# Patient Record
Sex: FEMALE | Race: WHITE | NOT HISPANIC OR LATINO | Employment: FULL TIME | URBAN - METROPOLITAN AREA
[De-identification: names, ages, dates, MRNs, and addresses within clinical notes are randomized per-mention and may not be internally consistent; named-entity substitution may affect disease eponyms.]

---

## 2019-02-01 ENCOUNTER — HOSPITAL ENCOUNTER (OUTPATIENT)
Dept: LAB | Facility: MEDICAL CENTER | Age: 39
End: 2019-02-01
Attending: OBSTETRICS & GYNECOLOGY
Payer: COMMERCIAL

## 2019-02-01 PROCEDURE — 88175 CYTOPATH C/V AUTO FLUID REDO: CPT

## 2019-02-01 PROCEDURE — 87624 HPV HI-RISK TYP POOLED RSLT: CPT

## 2019-02-05 LAB
CYTOLOGY REG CYTOL: NORMAL
HPV HR 12 DNA CVX QL NAA+PROBE: NEGATIVE
HPV16 DNA SPEC QL NAA+PROBE: NEGATIVE
HPV18 DNA SPEC QL NAA+PROBE: NEGATIVE
SPECIMEN SOURCE: NORMAL

## 2020-02-14 ENCOUNTER — OFFICE VISIT (OUTPATIENT)
Dept: URGENT CARE | Facility: PHYSICIAN GROUP | Age: 40
End: 2020-02-14
Payer: COMMERCIAL

## 2020-02-14 VITALS
WEIGHT: 245 LBS | HEART RATE: 98 BPM | BODY MASS INDEX: 37.13 KG/M2 | OXYGEN SATURATION: 100 % | TEMPERATURE: 98.6 F | HEIGHT: 68 IN | SYSTOLIC BLOOD PRESSURE: 102 MMHG | DIASTOLIC BLOOD PRESSURE: 74 MMHG | RESPIRATION RATE: 16 BRPM

## 2020-02-14 DIAGNOSIS — J45.31 MILD PERSISTENT ASTHMA WITH EXACERBATION: ICD-10-CM

## 2020-02-14 DIAGNOSIS — R05.9 COUGH: ICD-10-CM

## 2020-02-14 PROCEDURE — 99203 OFFICE O/P NEW LOW 30 MIN: CPT | Performed by: FAMILY MEDICINE

## 2020-02-14 RX ORDER — AZITHROMYCIN 250 MG/1
TABLET, FILM COATED ORAL
Qty: 6 TAB | Refills: 0 | Status: SHIPPED | OUTPATIENT
Start: 2020-02-14 | End: 2024-03-04

## 2020-02-14 RX ORDER — ALBUTEROL SULFATE 90 UG/1
2 POWDER, METERED RESPIRATORY (INHALATION) EVERY 4 HOURS PRN
Qty: 1 EACH | Refills: 0 | Status: SHIPPED | OUTPATIENT
Start: 2020-02-14 | End: 2022-12-13 | Stop reason: SDUPTHER

## 2020-02-14 RX ORDER — PREDNISONE 20 MG/1
40 TABLET ORAL DAILY
Qty: 10 TAB | Refills: 0 | Status: SHIPPED | OUTPATIENT
Start: 2020-02-14 | End: 2020-02-19

## 2020-02-14 SDOH — HEALTH STABILITY: MENTAL HEALTH: HOW OFTEN DO YOU HAVE A DRINK CONTAINING ALCOHOL?: MONTHLY OR LESS

## 2020-02-14 ASSESSMENT — ENCOUNTER SYMPTOMS
MYALGIAS: 0
EYE DISCHARGE: 0
NAUSEA: 0
HEADACHES: 1
EYE REDNESS: 0
SORE THROAT: 1
VOMITING: 0
WEIGHT LOSS: 0

## 2020-02-14 NOTE — PROGRESS NOTES
"Subjective:      Amol Bello is a 39 y.o. female who presents with Cough (sore throat, x1 day)            1 day productive cough without blood in sputum. Fever, tmax 100.5. + shortness of breath or wheezing.  PMH asthma.  Using albuterol every 6hr.No PMH COPD or pneumonia.  Symptoms are moderate severity and progressively worse.  Minimal relief with OTC medications.  No other aggravating or alleviating factors.      Review of Systems   Constitutional: Negative for malaise/fatigue and weight loss.   HENT: Positive for congestion and sore throat (mild).    Eyes: Negative for discharge and redness.   Gastrointestinal: Negative for nausea and vomiting.   Musculoskeletal: Negative for joint pain and myalgias.   Skin: Negative for itching and rash.   Neurological: Positive for headaches (mild).     .  Medications, Allergies, and current problem list reviewed today in Epic       Objective:     /74   Pulse 98   Temp 37 °C (98.6 °F) (Temporal)   Resp 16   Ht 1.727 m (5' 8\")   Wt 111.1 kg (245 lb)   SpO2 100%   BMI 37.25 kg/m²      Physical Exam  Constitutional:       General: She is not in acute distress.     Appearance: She is well-developed.   HENT:      Head: Normocephalic and atraumatic.      Right Ear: Tympanic membrane normal.      Left Ear: Tympanic membrane normal.      Nose: Congestion present. No rhinorrhea.      Mouth/Throat:      Pharynx: Posterior oropharyngeal erythema ( mild) present. No oropharyngeal exudate.   Eyes:      Conjunctiva/sclera: Conjunctivae normal.   Cardiovascular:      Rate and Rhythm: Normal rate and regular rhythm.      Heart sounds: Normal heart sounds. No murmur.   Pulmonary:      Effort: Pulmonary effort is normal.      Breath sounds: Normal breath sounds. No wheezing.   Skin:     General: Skin is warm and dry.      Findings: No rash.   Neurological:      Mental Status: She is alert and oriented to person, place, and time.                 Assessment/Plan:     1. Mild " persistent asthma with exacerbation  Albuterol Sulfate (PROAIR RESPICLICK) 108 (90 Base) MCG/ACT AEROSOL POWDER, BREATH ACTIVATED    predniSONE (DELTASONE) 20 MG Tab    azithromycin (ZITHROMAX) 250 MG Tab   2. Cough  Hydrocod Polst-CPM Polst ER (TUSSIONEX) 10-8 MG/5ML Suspension Extended Release    azithromycin (ZITHROMAX) 250 MG Tab     Differential diagnosis, natural history, supportive care, and indications for immediate follow-up discussed at length.

## 2022-06-02 ENCOUNTER — HOSPITAL ENCOUNTER (OUTPATIENT)
Dept: RADIOLOGY | Facility: MEDICAL CENTER | Age: 42
End: 2022-06-02
Attending: OBSTETRICS & GYNECOLOGY
Payer: COMMERCIAL

## 2022-06-02 DIAGNOSIS — Z12.31 VISIT FOR SCREENING MAMMOGRAM: ICD-10-CM

## 2022-06-02 PROCEDURE — 77063 BREAST TOMOSYNTHESIS BI: CPT

## 2022-08-22 ENCOUNTER — PHARMACY VISIT (OUTPATIENT)
Dept: PHARMACY | Facility: MEDICAL CENTER | Age: 42
End: 2022-08-22
Payer: COMMERCIAL

## 2022-08-22 PROCEDURE — RXMED WILLOW AMBULATORY MEDICATION CHARGE: Performed by: INTERNAL MEDICINE

## 2022-08-22 RX ORDER — CLOSTRIDIUM TETANI TOXOID ANTIGEN (FORMALDEHYDE INACTIVATED), CORYNEBACTERIUM DIPHTHERIAE TOXOID ANTIGEN (FORMALDEHYDE INACTIVATED), BORDETELLA PERTUSSIS TOXOID ANTIGEN (GLUTARALDEHYDE INACTIVATED), BORDETELLA PERTUSSIS FILAMENTOUS HEMAGGLUTININ ANTIGEN (FORMALDEHYDE INACTIVATED), BORDETELLA PERTUSSIS PERTACTIN ANTIGEN, AND BORDETELLA PERTUSSIS FIMBRIAE 2/3 ANTIGEN 5; 2; 2.5; 5; 3; 5 [LF]/.5ML; [LF]/.5ML; UG/.5ML; UG/.5ML; UG/.5ML; UG/.5ML
INJECTION, SUSPENSION INTRAMUSCULAR
Qty: 0.5 ML | Refills: 0 | Status: SHIPPED | OUTPATIENT
Start: 2022-08-22 | End: 2024-03-04

## 2022-08-22 RX ORDER — HEPATITIS B VACCINE (RECOMBINANT) ADJUVANTED 20 UG/.5ML
0.5 INJECTION, SOLUTION INTRAMUSCULAR
Qty: 0.5 ML | Refills: 0 | Status: SHIPPED | OUTPATIENT
Start: 2022-08-22 | End: 2024-03-04

## 2022-09-29 ENCOUNTER — PHARMACY VISIT (OUTPATIENT)
Dept: PHARMACY | Facility: MEDICAL CENTER | Age: 42
End: 2022-09-29
Payer: COMMERCIAL

## 2022-09-29 PROCEDURE — RXMED WILLOW AMBULATORY MEDICATION CHARGE: Performed by: INTERNAL MEDICINE

## 2022-09-29 RX ORDER — INFLUENZA A VIRUS A/BRISBANE/02/2018 IVR-190 (H1N1) ANTIGEN (FORMALDEHYDE INACTIVATED), INFLUENZA A VIRUS A/KANSAS/14/2017 X-327 (H3N2) ANTIGEN (FORMALDEHYDE INACTIVATED), INFLUENZA B VIRUS B/PHUKET/3073/2013 ANTIGEN (FORMALDEHYDE INACTIVATED), AND INFLUENZA B VIRUS B/MARYLAND/15/2016 BX-69A ANTIGEN (FORMALDEHYDE INACTIVATED) 15; 15; 15; 15 UG/.5ML; UG/.5ML; UG/.5ML; UG/.5ML
INJECTION, SUSPENSION INTRAMUSCULAR
Qty: 0.5 ML | Refills: 0 | Status: SHIPPED | OUTPATIENT
Start: 2022-09-29 | End: 2024-03-04

## 2022-09-29 RX ORDER — HEPATITIS B VACCINE (RECOMBINANT) 10 UG/ML
INJECTION, SUSPENSION INTRAMUSCULAR; SUBCUTANEOUS
Qty: 1 ML | Refills: 0 | Status: SHIPPED | OUTPATIENT
Start: 2022-09-29 | End: 2024-03-04

## 2022-12-12 SDOH — ECONOMIC STABILITY: FOOD INSECURITY: WITHIN THE PAST 12 MONTHS, THE FOOD YOU BOUGHT JUST DIDN'T LAST AND YOU DIDN'T HAVE MONEY TO GET MORE.: NEVER TRUE

## 2022-12-12 SDOH — ECONOMIC STABILITY: FOOD INSECURITY: WITHIN THE PAST 12 MONTHS, YOU WORRIED THAT YOUR FOOD WOULD RUN OUT BEFORE YOU GOT MONEY TO BUY MORE.: NEVER TRUE

## 2022-12-12 SDOH — ECONOMIC STABILITY: TRANSPORTATION INSECURITY
IN THE PAST 12 MONTHS, HAS THE LACK OF TRANSPORTATION KEPT YOU FROM MEDICAL APPOINTMENTS OR FROM GETTING MEDICATIONS?: NO

## 2022-12-12 SDOH — ECONOMIC STABILITY: HOUSING INSECURITY
IN THE LAST 12 MONTHS, WAS THERE A TIME WHEN YOU DID NOT HAVE A STEADY PLACE TO SLEEP OR SLEPT IN A SHELTER (INCLUDING NOW)?: NO

## 2022-12-12 SDOH — ECONOMIC STABILITY: HOUSING INSECURITY: IN THE LAST 12 MONTHS, HOW MANY PLACES HAVE YOU LIVED?: 1

## 2022-12-12 SDOH — ECONOMIC STABILITY: INCOME INSECURITY: HOW HARD IS IT FOR YOU TO PAY FOR THE VERY BASICS LIKE FOOD, HOUSING, MEDICAL CARE, AND HEATING?: NOT HARD AT ALL

## 2022-12-12 SDOH — HEALTH STABILITY: PHYSICAL HEALTH: ON AVERAGE, HOW MANY DAYS PER WEEK DO YOU ENGAGE IN MODERATE TO STRENUOUS EXERCISE (LIKE A BRISK WALK)?: 0 DAYS

## 2022-12-12 SDOH — HEALTH STABILITY: MENTAL HEALTH
STRESS IS WHEN SOMEONE FEELS TENSE, NERVOUS, ANXIOUS, OR CAN'T SLEEP AT NIGHT BECAUSE THEIR MIND IS TROUBLED. HOW STRESSED ARE YOU?: PATIENT DECLINED

## 2022-12-12 SDOH — ECONOMIC STABILITY: INCOME INSECURITY: IN THE LAST 12 MONTHS, WAS THERE A TIME WHEN YOU WERE NOT ABLE TO PAY THE MORTGAGE OR RENT ON TIME?: NO

## 2022-12-12 SDOH — ECONOMIC STABILITY: TRANSPORTATION INSECURITY
IN THE PAST 12 MONTHS, HAS LACK OF RELIABLE TRANSPORTATION KEPT YOU FROM MEDICAL APPOINTMENTS, MEETINGS, WORK OR FROM GETTING THINGS NEEDED FOR DAILY LIVING?: NO

## 2022-12-12 SDOH — ECONOMIC STABILITY: TRANSPORTATION INSECURITY
IN THE PAST 12 MONTHS, HAS LACK OF TRANSPORTATION KEPT YOU FROM MEETINGS, WORK, OR FROM GETTING THINGS NEEDED FOR DAILY LIVING?: NO

## 2022-12-12 SDOH — HEALTH STABILITY: PHYSICAL HEALTH: ON AVERAGE, HOW MANY MINUTES DO YOU ENGAGE IN EXERCISE AT THIS LEVEL?: 0 MIN

## 2022-12-12 ASSESSMENT — SOCIAL DETERMINANTS OF HEALTH (SDOH)
HOW OFTEN DO YOU GET TOGETHER WITH FRIENDS OR RELATIVES?: THREE TIMES A WEEK
HOW OFTEN DO YOU ATTEND CHURCH OR RELIGIOUS SERVICES?: NEVER
HOW OFTEN DO YOU ATTEND CHURCH OR RELIGIOUS SERVICES?: NEVER
HOW OFTEN DO YOU ATTENT MEETINGS OF THE CLUB OR ORGANIZATION YOU BELONG TO?: MORE THAN 4 TIMES PER YEAR
WITHIN THE PAST 12 MONTHS, YOU WORRIED THAT YOUR FOOD WOULD RUN OUT BEFORE YOU GOT THE MONEY TO BUY MORE: NEVER TRUE
IN A TYPICAL WEEK, HOW MANY TIMES DO YOU TALK ON THE PHONE WITH FAMILY, FRIENDS, OR NEIGHBORS?: MORE THAN THREE TIMES A WEEK
HOW MANY DRINKS CONTAINING ALCOHOL DO YOU HAVE ON A TYPICAL DAY WHEN YOU ARE DRINKING: 1 OR 2
HOW OFTEN DO YOU HAVE A DRINK CONTAINING ALCOHOL: 2-3 TIMES A WEEK
DO YOU BELONG TO ANY CLUBS OR ORGANIZATIONS SUCH AS CHURCH GROUPS UNIONS, FRATERNAL OR ATHLETIC GROUPS, OR SCHOOL GROUPS?: YES
HOW OFTEN DO YOU HAVE SIX OR MORE DRINKS ON ONE OCCASION: LESS THAN MONTHLY
HOW HARD IS IT FOR YOU TO PAY FOR THE VERY BASICS LIKE FOOD, HOUSING, MEDICAL CARE, AND HEATING?: NOT HARD AT ALL
DO YOU BELONG TO ANY CLUBS OR ORGANIZATIONS SUCH AS CHURCH GROUPS UNIONS, FRATERNAL OR ATHLETIC GROUPS, OR SCHOOL GROUPS?: YES
HOW OFTEN DO YOU ATTENT MEETINGS OF THE CLUB OR ORGANIZATION YOU BELONG TO?: MORE THAN 4 TIMES PER YEAR
HOW OFTEN DO YOU GET TOGETHER WITH FRIENDS OR RELATIVES?: THREE TIMES A WEEK
IN A TYPICAL WEEK, HOW MANY TIMES DO YOU TALK ON THE PHONE WITH FAMILY, FRIENDS, OR NEIGHBORS?: MORE THAN THREE TIMES A WEEK

## 2022-12-12 ASSESSMENT — LIFESTYLE VARIABLES
HOW MANY STANDARD DRINKS CONTAINING ALCOHOL DO YOU HAVE ON A TYPICAL DAY: 1 OR 2
AUDIT-C TOTAL SCORE: 4
HOW OFTEN DO YOU HAVE SIX OR MORE DRINKS ON ONE OCCASION: LESS THAN MONTHLY
HOW OFTEN DO YOU HAVE A DRINK CONTAINING ALCOHOL: 2-3 TIMES A WEEK
SKIP TO QUESTIONS 9-10: 0

## 2022-12-13 ENCOUNTER — OFFICE VISIT (OUTPATIENT)
Dept: MEDICAL GROUP | Facility: LAB | Age: 42
End: 2022-12-13
Payer: COMMERCIAL

## 2022-12-13 ENCOUNTER — PATIENT MESSAGE (OUTPATIENT)
Dept: MEDICAL GROUP | Facility: LAB | Age: 42
End: 2022-12-13

## 2022-12-13 VITALS
SYSTOLIC BLOOD PRESSURE: 122 MMHG | OXYGEN SATURATION: 100 % | WEIGHT: 232 LBS | DIASTOLIC BLOOD PRESSURE: 72 MMHG | HEART RATE: 68 BPM | RESPIRATION RATE: 16 BRPM | TEMPERATURE: 97 F | BODY MASS INDEX: 35.16 KG/M2 | HEIGHT: 68 IN

## 2022-12-13 DIAGNOSIS — Z76.89 ESTABLISHING CARE WITH NEW DOCTOR, ENCOUNTER FOR: ICD-10-CM

## 2022-12-13 DIAGNOSIS — E66.09 CLASS 2 OBESITY DUE TO EXCESS CALORIES WITHOUT SERIOUS COMORBIDITY WITH BODY MASS INDEX (BMI) OF 35.0 TO 35.9 IN ADULT: ICD-10-CM

## 2022-12-13 DIAGNOSIS — Q66.70 HIGH ARCH: ICD-10-CM

## 2022-12-13 DIAGNOSIS — E55.9 VITAMIN D DEFICIENCY: ICD-10-CM

## 2022-12-13 DIAGNOSIS — J45.31 MILD PERSISTENT ASTHMA WITH EXACERBATION: ICD-10-CM

## 2022-12-13 DIAGNOSIS — E78.5 DYSLIPIDEMIA: ICD-10-CM

## 2022-12-13 PROCEDURE — 99204 OFFICE O/P NEW MOD 45 MIN: CPT | Performed by: INTERNAL MEDICINE

## 2022-12-13 RX ORDER — MONTELUKAST SODIUM 10 MG/1
10 TABLET ORAL
Qty: 30 TABLET | Refills: 3 | Status: SHIPPED | OUTPATIENT
Start: 2022-12-13 | End: 2024-03-04 | Stop reason: SDUPTHER

## 2022-12-13 RX ORDER — METHYLPREDNISOLONE 4 MG/1
TABLET ORAL
Qty: 21 TABLET | Refills: 0 | Status: SHIPPED | OUTPATIENT
Start: 2022-12-13 | End: 2024-03-04

## 2022-12-13 RX ORDER — ALBUTEROL SULFATE 90 UG/1
2 POWDER, METERED RESPIRATORY (INHALATION) EVERY 4 HOURS PRN
Qty: 1 EACH | Refills: 0 | Status: SHIPPED | OUTPATIENT
Start: 2022-12-13 | End: 2024-03-04 | Stop reason: SDUPTHER

## 2022-12-13 ASSESSMENT — VISUAL ACUITY
OD_CC: 20/20
OS_CC: 20/25

## 2022-12-14 ENCOUNTER — APPOINTMENT (OUTPATIENT)
Dept: RADIOLOGY | Facility: MEDICAL CENTER | Age: 42
End: 2022-12-14
Attending: INTERNAL MEDICINE
Payer: COMMERCIAL

## 2022-12-14 DIAGNOSIS — Q66.70 HIGH ARCH: ICD-10-CM

## 2022-12-14 DIAGNOSIS — Z76.89 ESTABLISHING CARE WITH NEW DOCTOR, ENCOUNTER FOR: ICD-10-CM

## 2022-12-14 PROCEDURE — 73562 X-RAY EXAM OF KNEE 3: CPT | Mod: RT

## 2022-12-14 NOTE — PROGRESS NOTES
CC: Amol Bello is a 42 y.o. female is suffering from   Chief Complaint   Patient presents with    Establish Care         SUBJECTIVE:  1. Establishing care with new doctor, encounter for  Patient is here for follow-up, is in need of basic med flight examination which was completed.    2. Vitamin D deficiency  History of vitamin D deficiency recheck levels    3. Class 2 obesity due to excess calories without serious comorbidity with body mass index (BMI) of 35.0 to 35.9 in adult  Morbid obesity consider possible use of Ozempic if approved by FAA    4. Dyslipidemia  Recheck lipid profile    5. High arch  Referral written to podiatry    6. Mild persistent asthma with exacerbation  Clinically stable        Past social, family, history: , , works as an investigator for the [employer]  Social History     Tobacco Use    Smoking status: Never    Smokeless tobacco: Never   Vaping Use    Vaping Use: Never used   Substance Use Topics    Alcohol use: Yes         MEDICATIONS:    Current Outpatient Medications:     Albuterol Sulfate (PROAIR RESPICLICK) 108 (90 Base) MCG/ACT AEROSOL POWDER, BREATH ACTIVATED, Inhale 2 Puffs every four hours as needed (exercise asthma)., Disp: 1 Each, Rfl: 0    montelukast (SINGULAIR) 10 MG Tab, Take 1 Tablet by mouth 1 time a day as needed (sob)., Disp: 30 Tablet, Rfl: 3    methylPREDNISolone (MEDROL DOSEPAK) 4 MG Tablet Therapy Pack, As directed on the packaging label., Disp: 21 Tablet, Rfl: 0    hepatitis B vaccine (RECOMBIVAX HB) 10 MCG/ML Suspension, Inject  into the shoulder, thigh, or buttocks., Disp: 1 mL, Rfl: 0    influenza vaccine quad (FLUZONE QUADRIVALENT) 0.5 ML Suspension Prefilled Syringe injection, Inject  into the shoulder, thigh, or buttocks., Disp: 0.5 mL, Rfl: 0    tetanus-dipth-acell pertussis (ADACEL) 5-2-15.5 LF-MCG/0.5 Suspension, Inject  into the shoulder, thigh, or buttocks., Disp: 0.5 mL, Rfl: 0    Hepatitis B Vac Recomb Adj 20 mcg/0.5mL  "(HEPLISAV-B) 20 MCG/0.5ML Solution Prefilled Syringe, Inject 0.5 mL into the shoulder, thigh, or buttocks., Disp: 0.5 mL, Rfl: 0    azithromycin (ZITHROMAX) 250 MG Tab, 2 PO day #1 then 1 PO day #2-5, Disp: 6 Tab, Rfl: 0    promethazine-codeine (PHENERGAN-CODEINE) 6.25-10 MG/5ML SYRP, Take 5-10 mL by mouth 4 times a day as needed for Cough., Disp: 150 mL, Rfl: 0    PROBLEMS:  There are no problems to display for this patient.      REVIEW OF SYSTEMS:  Gen.:  No Nausea, Vomiting, fever, Chills.  Heart: No chest pain.  Lungs:  No shortness of Breath.  Psychological: Scotty unusual Anxiety depression     PHYSICAL EXAM   Constitutional: Alert, cooperative, not in acute distress.  Cardiovascular:  Rate Rhythm is regular without murmurs rubs clicks.     Thorax & Lungs: Clear to auscultation, no wheezing, rhonchi, or rales  HENT: Normocephalic, Atraumatic.  Eyes: PERRLA, EOMI, Conjunctiva normal.   Neck: Trachia is midline no swelling of the thyroid.   Lymphatic: No lymphadenopathy noted.   Abdomin: Soft non-tender, no rebound, no guarding.   Skin: Warm, Dry, No erythema, No rash.   Extremities: Atraumatic with symmetric distal pulses, No edema, No tenderness, No cyanosis, No clubbing.   Musculoskeletal: Moves all extremities without difficulty  Neurologic: Alert & oriented x 3, cranial nerves II through XII are intact, Normal motor function, Normal sensory function, No focal deficits noted.   Psychiatric: Affect normal, Judgment normal, Mood normal.     VITAL SIGNS:/72   Pulse 68   Temp 36.1 °C (97 °F) (Temporal)   Resp 16   Ht 1.727 m (5' 8\")   Wt 105 kg (232 lb)   SpO2 100%   BMI 35.28 kg/m²     Labs: Reviewed    Assessment:                                                     Plan:    1. Establishing care with new doctor, encounter for  Patient appears to be in excellent physical condition  - CBC WITH DIFFERENTIAL; Future  - Comp Metabolic Panel; Future  - DX-KNEE 3 VIEWS RIGHT; Future  - methylPREDNISolone " (MEDROL DOSEPAK) 4 MG Tablet Therapy Pack; As directed on the packaging label.  Dispense: 21 Tablet; Refill: 0    2. Vitamin D deficiency  Recheck vitamin D  - CBC WITH DIFFERENTIAL; Future  - Comp Metabolic Panel; Future  - VITAMIN D,25 HYDROXY (DEFICIENCY); Future    3. Class 2 obesity due to excess calories without serious comorbidity with body mass index (BMI) of 35.0 to 35.9 in adult  Consider Ozempic  - CBC WITH DIFFERENTIAL; Future  - Comp Metabolic Panel; Future  - FREE THYROXINE; Future  - TSH; Future    4. Dyslipidemia  Recheck lipid profile  - CBC WITH DIFFERENTIAL; Future  - Comp Metabolic Panel; Future  - Lipid Profile; Future    5. High arch  Referral written podiatry x-ray ordered in the knee  - DX-KNEE 3 VIEWS RIGHT; Future  - Referral to Podiatry  - methylPREDNISolone (MEDROL DOSEPAK) 4 MG Tablet Therapy Pack; As directed on the packaging label.  Dispense: 21 Tablet; Refill: 0    6. Mild persistent asthma with exacerbation  Continue albuterol  - Albuterol Sulfate (PROAIR RESPICLICK) 108 (90 Base) MCG/ACT AEROSOL POWDER, BREATH ACTIVATED; Inhale 2 Puffs every four hours as needed (exercise asthma).  Dispense: 1 Each; Refill: 0  - montelukast (SINGULAIR) 10 MG Tab; Take 1 Tablet by mouth 1 time a day as needed (sob).  Dispense: 30 Tablet; Refill: 3  - methylPREDNISolone (MEDROL DOSEPAK) 4 MG Tablet Therapy Pack; As directed on the packaging label.  Dispense: 21 Tablet; Refill: 0

## 2022-12-20 ENCOUNTER — TELEPHONE (OUTPATIENT)
Dept: MEDICAL GROUP | Facility: LAB | Age: 42
End: 2022-12-20
Payer: COMMERCIAL

## 2022-12-20 NOTE — TELEPHONE ENCOUNTER
MEDICATION PRIOR AUTHORIZATION NEEDED:    1. Name of Medication: ProAir Respiclick    2. Requested By (Name of Pharmacy): [facility]      3. Is insurance on file current? yes    4. What is the name & phone number of the 3rd party payor? [employer]     Cover My Meds Key #  [id number]    Med approved today

## 2022-12-22 ENCOUNTER — HOSPITAL ENCOUNTER (OUTPATIENT)
Dept: LAB | Facility: MEDICAL CENTER | Age: 42
End: 2022-12-22
Attending: INTERNAL MEDICINE
Payer: COMMERCIAL

## 2022-12-22 DIAGNOSIS — E78.5 DYSLIPIDEMIA: ICD-10-CM

## 2022-12-22 DIAGNOSIS — E66.09 CLASS 2 OBESITY DUE TO EXCESS CALORIES WITHOUT SERIOUS COMORBIDITY WITH BODY MASS INDEX (BMI) OF 35.0 TO 35.9 IN ADULT: ICD-10-CM

## 2022-12-22 DIAGNOSIS — E55.9 VITAMIN D DEFICIENCY: ICD-10-CM

## 2022-12-22 DIAGNOSIS — Z76.89 ESTABLISHING CARE WITH NEW DOCTOR, ENCOUNTER FOR: ICD-10-CM

## 2022-12-22 LAB
25(OH)D3 SERPL-MCNC: 40 NG/ML (ref 30–100)
ALBUMIN SERPL BCP-MCNC: 4.3 G/DL (ref 3.2–4.9)
ALBUMIN/GLOB SERPL: 1.5 G/DL
ALP SERPL-CCNC: 38 U/L (ref 30–99)
ALT SERPL-CCNC: 25 U/L (ref 2–50)
ANION GAP SERPL CALC-SCNC: 7 MMOL/L (ref 7–16)
AST SERPL-CCNC: 16 U/L (ref 12–45)
BASOPHILS # BLD AUTO: 0.5 % (ref 0–1.8)
BASOPHILS # BLD: 0.04 K/UL (ref 0–0.12)
BILIRUB SERPL-MCNC: 0.3 MG/DL (ref 0.1–1.5)
BUN SERPL-MCNC: 17 MG/DL (ref 8–22)
CALCIUM ALBUM COR SERPL-MCNC: 9.1 MG/DL (ref 8.5–10.5)
CALCIUM SERPL-MCNC: 9.3 MG/DL (ref 8.5–10.5)
CHLORIDE SERPL-SCNC: 105 MMOL/L (ref 96–112)
CHOLEST SERPL-MCNC: 197 MG/DL (ref 100–199)
CO2 SERPL-SCNC: 28 MMOL/L (ref 20–33)
CREAT SERPL-MCNC: 0.89 MG/DL (ref 0.5–1.4)
EOSINOPHIL # BLD AUTO: 0.3 K/UL (ref 0–0.51)
EOSINOPHIL NFR BLD: 4.1 % (ref 0–6.9)
ERYTHROCYTE [DISTWIDTH] IN BLOOD BY AUTOMATED COUNT: 40.6 FL (ref 35.9–50)
FASTING STATUS PATIENT QL REPORTED: NORMAL
GFR SERPLBLD CREATININE-BSD FMLA CKD-EPI: 84 ML/MIN/1.73 M 2
GLOBULIN SER CALC-MCNC: 2.9 G/DL (ref 1.9–3.5)
GLUCOSE SERPL-MCNC: 93 MG/DL (ref 65–99)
HCT VFR BLD AUTO: 46.9 % (ref 37–47)
HDLC SERPL-MCNC: 66 MG/DL
HGB BLD-MCNC: 15.3 G/DL (ref 12–16)
IMM GRANULOCYTES # BLD AUTO: 0.05 K/UL (ref 0–0.11)
IMM GRANULOCYTES NFR BLD AUTO: 0.7 % (ref 0–0.9)
LDLC SERPL CALC-MCNC: 113 MG/DL
LYMPHOCYTES # BLD AUTO: 2.71 K/UL (ref 1–4.8)
LYMPHOCYTES NFR BLD: 37.2 % (ref 22–41)
MCH RBC QN AUTO: 29.5 PG (ref 27–33)
MCHC RBC AUTO-ENTMCNC: 32.6 G/DL (ref 33.6–35)
MCV RBC AUTO: 90.4 FL (ref 81.4–97.8)
MONOCYTES # BLD AUTO: 0.4 K/UL (ref 0–0.85)
MONOCYTES NFR BLD AUTO: 5.5 % (ref 0–13.4)
NEUTROPHILS # BLD AUTO: 3.78 K/UL (ref 2–7.15)
NEUTROPHILS NFR BLD: 52 % (ref 44–72)
NRBC # BLD AUTO: 0 K/UL
NRBC BLD-RTO: 0 /100 WBC
PLATELET # BLD AUTO: 266 K/UL (ref 164–446)
PMV BLD AUTO: 10 FL (ref 9–12.9)
POTASSIUM SERPL-SCNC: 4.3 MMOL/L (ref 3.6–5.5)
PROT SERPL-MCNC: 7.2 G/DL (ref 6–8.2)
RBC # BLD AUTO: 5.19 M/UL (ref 4.2–5.4)
SODIUM SERPL-SCNC: 140 MMOL/L (ref 135–145)
T4 FREE SERPL-MCNC: 1.2 NG/DL (ref 0.93–1.7)
TRIGL SERPL-MCNC: 89 MG/DL (ref 0–149)
TSH SERPL DL<=0.005 MIU/L-ACNC: 2.19 UIU/ML (ref 0.38–5.33)
WBC # BLD AUTO: 7.3 K/UL (ref 4.8–10.8)

## 2022-12-22 PROCEDURE — 80053 COMPREHEN METABOLIC PANEL: CPT

## 2022-12-22 PROCEDURE — 84443 ASSAY THYROID STIM HORMONE: CPT

## 2022-12-22 PROCEDURE — 80061 LIPID PANEL: CPT

## 2022-12-22 PROCEDURE — 82306 VITAMIN D 25 HYDROXY: CPT

## 2022-12-22 PROCEDURE — 85025 COMPLETE CBC W/AUTO DIFF WBC: CPT

## 2022-12-22 PROCEDURE — 84439 ASSAY OF FREE THYROXINE: CPT

## 2022-12-22 PROCEDURE — 36415 COLL VENOUS BLD VENIPUNCTURE: CPT

## 2022-12-28 ENCOUNTER — OFFICE VISIT (OUTPATIENT)
Dept: MEDICAL GROUP | Facility: LAB | Age: 42
End: 2022-12-28
Payer: COMMERCIAL

## 2022-12-28 VITALS
RESPIRATION RATE: 16 BRPM | OXYGEN SATURATION: 96 % | HEIGHT: 68 IN | HEART RATE: 85 BPM | BODY MASS INDEX: 35.57 KG/M2 | WEIGHT: 234.7 LBS | TEMPERATURE: 97.2 F

## 2022-12-28 DIAGNOSIS — M25.561 CHRONIC PAIN OF RIGHT KNEE: ICD-10-CM

## 2022-12-28 DIAGNOSIS — M77.11 RIGHT LATERAL EPICONDYLITIS: ICD-10-CM

## 2022-12-28 DIAGNOSIS — Q66.70 HIGH ARCHES: ICD-10-CM

## 2022-12-28 DIAGNOSIS — G89.29 CHRONIC PAIN OF RIGHT KNEE: ICD-10-CM

## 2022-12-28 PROCEDURE — 99214 OFFICE O/P EST MOD 30 MIN: CPT | Mod: 25 | Performed by: INTERNAL MEDICINE

## 2022-12-28 PROCEDURE — 20605 DRAIN/INJ JOINT/BURSA W/O US: CPT | Mod: RT | Performed by: INTERNAL MEDICINE

## 2022-12-28 ASSESSMENT — FIBROSIS 4 INDEX: FIB4 SCORE: 0.48

## 2022-12-28 NOTE — LETTER
December 28, 2022        Amol Bello  [address] [facility] [address]        Dear Amol:    ***    If you have any questions or concerns, please don't hesitate to call.        Sincerely,        Chun Kolb D.O.    Electronically Signed

## 2022-12-28 NOTE — LETTER
December 28, 2022        Patient: Amol Bello   YOB: 1980   Date of Visit: 12/28/2022           To Whom It May Concern:    It is my medical opinion that Amol Bello needs to limit her lifting to no more than 10 lbs for the next two weeks.  Queta may return to full duty on January 11, 2023.     If you have any questions or concerns, please don't hesitate to call.        Sincerely,          Chun Kolb D.O.  Board Certified General Internal Medicine.      [address] [id number] [phone] (Phone)  [phone] (Fax)

## 2022-12-28 NOTE — PROGRESS NOTES
CC: Amol Bello is a 42 y.o. female is suffering from   Chief Complaint   Patient presents with    Elbow Pain     Right elbow pain    Results     Lab and imaging results         SUBJECTIVE:  1. High arches  Patient is here for follow-up, continues to have problems with right knee pain, suspected to be secondary to elevated arches associate with her feet, patient is not using orthotics at this time.  Referral was written to podiatry    2. Chronic pain of right knee  Chronic right knee pain MRI ordered patient states she has had problems with knee pain for the past 2 years x-rays were reviewed with the patient    3. Right lateral epicondylitis  Right lateral epicondylitis injected in the office      Past social, family, history: , works for [employer]  Social History     Tobacco Use    Smoking status: Never    Smokeless tobacco: Never   Vaping Use    Vaping Use: Never used   Substance Use Topics    Alcohol use: Yes         MEDICATIONS:    Current Outpatient Medications:     Albuterol Sulfate (PROAIR RESPICLICK) 108 (90 Base) MCG/ACT AEROSOL POWDER, BREATH ACTIVATED, Inhale 2 Puffs every four hours as needed (exercise asthma)., Disp: 1 Each, Rfl: 0    montelukast (SINGULAIR) 10 MG Tab, Take 1 Tablet by mouth 1 time a day as needed (sob)., Disp: 30 Tablet, Rfl: 3    methylPREDNISolone (MEDROL DOSEPAK) 4 MG Tablet Therapy Pack, As directed on the packaging label. (Patient not taking: Reported on 12/28/2022), Disp: 21 Tablet, Rfl: 0    hepatitis B vaccine (RECOMBIVAX HB) 10 MCG/ML Suspension, Inject  into the shoulder, thigh, or buttocks., Disp: 1 mL, Rfl: 0    influenza vaccine quad (FLUZONE QUADRIVALENT) 0.5 ML Suspension Prefilled Syringe injection, Inject  into the shoulder, thigh, or buttocks., Disp: 0.5 mL, Rfl: 0    tetanus-dipth-acell pertussis (ADACEL) 5-2-15.5 LF-MCG/0.5 Suspension, Inject  into the shoulder, thigh, or buttocks., Disp: 0.5 mL, Rfl: 0    Hepatitis B Vac Recomb Adj 20 mcg/0.5mL  "(HEPLISAV-B) 20 MCG/0.5ML Solution Prefilled Syringe, Inject 0.5 mL into the shoulder, thigh, or buttocks., Disp: 0.5 mL, Rfl: 0    azithromycin (ZITHROMAX) 250 MG Tab, 2 PO day #1 then 1 PO day #2-5, Disp: 6 Tab, Rfl: 0    promethazine-codeine (PHENERGAN-CODEINE) 6.25-10 MG/5ML SYRP, Take 5-10 mL by mouth 4 times a day as needed for Cough., Disp: 150 mL, Rfl: 0    PROBLEMS:  There are no problems to display for this patient.      REVIEW OF SYSTEMS:  Gen.:  No Nausea, Vomiting, fever, Chills.  Heart: No chest pain.  Lungs:  No shortness of Breath.  Psychological: Scotty unusual Anxiety depression     PHYSICAL EXAM   Constitutional: Alert, cooperative, not in acute distress.  Cardiovascular:  Rate Rhythm is regular without murmurs rubs clicks.     Thorax & Lungs: Clear to auscultation, no wheezing, rhonchi, or rales  HENT: Normocephalic, Atraumatic.  Eyes: PERRLA, EOMI, Conjunctiva normal.   Neck: Trachia is midline no swelling of the thyroid.   Lymphatic: No lymphadenopathy noted.   Musculoskeletal: Pain to palpation right lateral elbow  Neurologic: Alert & oriented x 3, cranial nerves II through XII are intact, Normal motor function, Normal sensory function, No focal deficits noted.   Psychiatric: Affect normal, Judgment normal, Mood normal.     VITAL SIGNS:Pulse 85   Temp 36.2 °C (97.2 °F) (Temporal)   Resp 16   Ht 1.727 m (5' 8\")   Wt 106 kg (234 lb 11.2 oz)   SpO2 96%   BMI 35.69 kg/m²     Labs: Reviewed    Assessment:                                                     Plan:    1. High arches  Patient with high arches has been already referred to podiatry for evaluation suspect this may be causing #2 below  - Referral to Podiatry    2. Chronic pain of right knee  X-rays reviewed, no obvious findings, patient continues to have complaints of pain at the right lateral tibia, knee joint itself, this is gone on for least 2 years  - MR-KNEE-W/O RIGHT; Future    3. Right lateral epicondylitis  Right lateral " epicondylitis injected in the office.    - triamcinolone acetonide (KENALOG-40) injection 20 mg  - lidocaine PF (XYLOCAINE-MPF) 2 % injection PF 0.5 mL    Procedure note: Injection right lateral elbow  Consents: Patient was consented regarding risk benefits including possible infection, poor outcome, tendon rupture, neurological damage, all questions were answered  Procedure: Area of maximal tenderness appreciated right lateral epicondyle was identified by palpation and then marked with a retracted ballpoint pen, the area was then infiltrated with 1/2 mL of Xylocaine 2% and Kenalog 20%  Patient condition: Patient tolerated procedure well did not experience complete resolution of her elbow pain discomfort  Discharge instructions: Patient is to avoid heavy lifting pulling over the next 2 weeks.  Must notify a physician immediately should she notice any redness pus or discharge and be evaluated in emergency room setting if necessary.

## 2022-12-29 RX ORDER — TRIAMCINOLONE ACETONIDE 40 MG/ML
20 INJECTION, SUSPENSION INTRA-ARTICULAR; INTRAMUSCULAR ONCE
Status: COMPLETED | OUTPATIENT
Start: 2022-12-29 | End: 2022-12-29

## 2022-12-29 RX ORDER — LIDOCAINE HYDROCHLORIDE 20 MG/ML
0.5 INJECTION, SOLUTION EPIDURAL; INFILTRATION; INTRACAUDAL; PERINEURAL ONCE
Status: COMPLETED | OUTPATIENT
Start: 2022-12-29 | End: 2022-12-29

## 2022-12-29 RX ADMIN — TRIAMCINOLONE ACETONIDE 20 MG: 40 INJECTION, SUSPENSION INTRA-ARTICULAR; INTRAMUSCULAR at 09:23

## 2022-12-29 RX ADMIN — LIDOCAINE HYDROCHLORIDE 0.5 ML: 20 INJECTION, SOLUTION EPIDURAL; INFILTRATION; INTRACAUDAL; PERINEURAL at 09:22

## 2023-01-18 ENCOUNTER — HOSPITAL ENCOUNTER (OUTPATIENT)
Dept: RADIOLOGY | Facility: MEDICAL CENTER | Age: 43
End: 2023-01-18
Attending: INTERNAL MEDICINE
Payer: COMMERCIAL

## 2023-01-18 DIAGNOSIS — G89.29 CHRONIC PAIN OF RIGHT KNEE: ICD-10-CM

## 2023-01-18 DIAGNOSIS — M25.561 CHRONIC PAIN OF RIGHT KNEE: ICD-10-CM

## 2023-01-18 PROCEDURE — 73721 MRI JNT OF LWR EXTRE W/O DYE: CPT | Mod: RT

## 2023-10-06 ENCOUNTER — HOSPITAL ENCOUNTER (OUTPATIENT)
Dept: RADIOLOGY | Facility: MEDICAL CENTER | Age: 43
End: 2023-10-06
Attending: INTERNAL MEDICINE
Payer: COMMERCIAL

## 2023-10-06 DIAGNOSIS — Z12.31 VISIT FOR SCREENING MAMMOGRAM: ICD-10-CM

## 2023-10-06 PROCEDURE — 77063 BREAST TOMOSYNTHESIS BI: CPT

## 2024-02-23 ENCOUNTER — PATIENT MESSAGE (OUTPATIENT)
Dept: MEDICAL GROUP | Facility: LAB | Age: 44
End: 2024-02-23
Payer: COMMERCIAL

## 2024-02-26 ENCOUNTER — HOSPITAL ENCOUNTER (OUTPATIENT)
Dept: LAB | Facility: MEDICAL CENTER | Age: 44
End: 2024-02-26
Payer: COMMERCIAL

## 2024-02-26 LAB
ALBUMIN SERPL BCP-MCNC: 4 G/DL (ref 3.2–4.9)
ALBUMIN/GLOB SERPL: 1.3 G/DL
ALP SERPL-CCNC: 35 U/L (ref 30–99)
ALT SERPL-CCNC: 27 U/L (ref 2–50)
ANION GAP SERPL CALC-SCNC: 12 MMOL/L (ref 7–16)
AST SERPL-CCNC: 15 U/L (ref 12–45)
BASOPHILS # BLD AUTO: 0.6 % (ref 0–1.8)
BASOPHILS # BLD: 0.04 K/UL (ref 0–0.12)
BILIRUB SERPL-MCNC: 0.5 MG/DL (ref 0.1–1.5)
BUN SERPL-MCNC: 15 MG/DL (ref 8–22)
CALCIUM ALBUM COR SERPL-MCNC: 9 MG/DL (ref 8.5–10.5)
CALCIUM SERPL-MCNC: 9 MG/DL (ref 8.5–10.5)
CHLORIDE SERPL-SCNC: 105 MMOL/L (ref 96–112)
CHOLEST SERPL-MCNC: 171 MG/DL (ref 100–199)
CO2 SERPL-SCNC: 23 MMOL/L (ref 20–33)
CREAT SERPL-MCNC: 0.79 MG/DL (ref 0.5–1.4)
EOSINOPHIL # BLD AUTO: 0.21 K/UL (ref 0–0.51)
EOSINOPHIL NFR BLD: 3.2 % (ref 0–6.9)
ERYTHROCYTE [DISTWIDTH] IN BLOOD BY AUTOMATED COUNT: 39.8 FL (ref 35.9–50)
EST. AVERAGE GLUCOSE BLD GHB EST-MCNC: 108 MG/DL
FASTING STATUS PATIENT QL REPORTED: NORMAL
GFR SERPLBLD CREATININE-BSD FMLA CKD-EPI: 96 ML/MIN/1.73 M 2
GLOBULIN SER CALC-MCNC: 3.2 G/DL (ref 1.9–3.5)
GLUCOSE SERPL-MCNC: 99 MG/DL (ref 65–99)
HBA1C MFR BLD: 5.4 % (ref 4–5.6)
HCT VFR BLD AUTO: 46.4 % (ref 37–47)
HDLC SERPL-MCNC: 59 MG/DL
HGB BLD-MCNC: 15.2 G/DL (ref 12–16)
IMM GRANULOCYTES # BLD AUTO: 0.01 K/UL (ref 0–0.11)
IMM GRANULOCYTES NFR BLD AUTO: 0.2 % (ref 0–0.9)
LDLC SERPL CALC-MCNC: 99 MG/DL
LYMPHOCYTES # BLD AUTO: 2.08 K/UL (ref 1–4.8)
LYMPHOCYTES NFR BLD: 32 % (ref 22–41)
MCH RBC QN AUTO: 29.8 PG (ref 27–33)
MCHC RBC AUTO-ENTMCNC: 32.8 G/DL (ref 32.2–35.5)
MCV RBC AUTO: 91 FL (ref 81.4–97.8)
MONOCYTES # BLD AUTO: 0.51 K/UL (ref 0–0.85)
MONOCYTES NFR BLD AUTO: 7.8 % (ref 0–13.4)
NEUTROPHILS # BLD AUTO: 3.66 K/UL (ref 1.82–7.42)
NEUTROPHILS NFR BLD: 56.2 % (ref 44–72)
NRBC # BLD AUTO: 0 K/UL
NRBC BLD-RTO: 0 /100 WBC (ref 0–0.2)
PLATELET # BLD AUTO: 284 K/UL (ref 164–446)
PMV BLD AUTO: 11.3 FL (ref 9–12.9)
POTASSIUM SERPL-SCNC: 4.7 MMOL/L (ref 3.6–5.5)
PROT SERPL-MCNC: 7.2 G/DL (ref 6–8.2)
RBC # BLD AUTO: 5.1 M/UL (ref 4.2–5.4)
SODIUM SERPL-SCNC: 140 MMOL/L (ref 135–145)
TRIGL SERPL-MCNC: 66 MG/DL (ref 0–149)
TSH SERPL DL<=0.005 MIU/L-ACNC: 1.82 UIU/ML (ref 0.38–5.33)
WBC # BLD AUTO: 6.5 K/UL (ref 4.8–10.8)

## 2024-02-26 PROCEDURE — 84443 ASSAY THYROID STIM HORMONE: CPT

## 2024-02-26 PROCEDURE — 36415 COLL VENOUS BLD VENIPUNCTURE: CPT

## 2024-02-26 PROCEDURE — 80053 COMPREHEN METABOLIC PANEL: CPT

## 2024-02-26 PROCEDURE — 83036 HEMOGLOBIN GLYCOSYLATED A1C: CPT

## 2024-02-26 PROCEDURE — 83525 ASSAY OF INSULIN: CPT

## 2024-02-26 PROCEDURE — 80061 LIPID PANEL: CPT

## 2024-02-26 PROCEDURE — 85025 COMPLETE CBC W/AUTO DIFF WBC: CPT

## 2024-02-28 LAB — INSULIN P FAST SERPL-ACNC: 15 UIU/ML (ref 3–25)

## 2024-02-29 ENCOUNTER — APPOINTMENT (OUTPATIENT)
Dept: MEDICAL GROUP | Facility: LAB | Age: 44
End: 2024-02-29
Payer: COMMERCIAL

## 2024-03-04 ENCOUNTER — OFFICE VISIT (OUTPATIENT)
Dept: MEDICAL GROUP | Facility: LAB | Age: 44
End: 2024-03-04
Payer: COMMERCIAL

## 2024-03-04 VITALS
WEIGHT: 236 LBS | SYSTOLIC BLOOD PRESSURE: 124 MMHG | HEIGHT: 68 IN | HEART RATE: 65 BPM | OXYGEN SATURATION: 96 % | BODY MASS INDEX: 35.77 KG/M2 | DIASTOLIC BLOOD PRESSURE: 74 MMHG | RESPIRATION RATE: 14 BRPM | TEMPERATURE: 98.7 F

## 2024-03-04 DIAGNOSIS — M25.521 RIGHT ELBOW PAIN: ICD-10-CM

## 2024-03-04 DIAGNOSIS — I49.8 SINUS ARRHYTHMIA: ICD-10-CM

## 2024-03-04 DIAGNOSIS — J45.31 MILD PERSISTENT ASTHMA WITH EXACERBATION: ICD-10-CM

## 2024-03-04 PROCEDURE — 99214 OFFICE O/P EST MOD 30 MIN: CPT | Performed by: INTERNAL MEDICINE

## 2024-03-04 PROCEDURE — 3074F SYST BP LT 130 MM HG: CPT | Performed by: INTERNAL MEDICINE

## 2024-03-04 PROCEDURE — 3078F DIAST BP <80 MM HG: CPT | Performed by: INTERNAL MEDICINE

## 2024-03-04 RX ORDER — ALBUTEROL SULFATE 90 UG/1
2 POWDER, METERED RESPIRATORY (INHALATION) EVERY 4 HOURS PRN
Qty: 1 EACH | Refills: 0 | Status: SHIPPED | OUTPATIENT
Start: 2024-03-04

## 2024-03-04 RX ORDER — MONTELUKAST SODIUM 10 MG/1
10 TABLET ORAL
Qty: 30 TABLET | Refills: 3 | Status: SHIPPED | OUTPATIENT
Start: 2024-03-04

## 2024-03-04 ASSESSMENT — FIBROSIS 4 INDEX: FIB4 SCORE: 0.42

## 2024-03-05 NOTE — PROGRESS NOTES
CC: Amol Bello is a 43 y.o. female is suffering from   Chief Complaint   Patient presents with    Follow-Up         SUBJECTIVE:  1. 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 Right elbow pain  History of right elbow pain referral written to orthopedics    3. Mild persistent asthma with exacerbation  Mild asthma continue with albuterol        Past social, family, history: , works as an investigator for the 's office  Social History     Tobacco Use    Smoking status: Never    Smokeless tobacco: Never   Vaping Use    Vaping Use: Never used   Substance Use Topics    Alcohol use: Yes         MEDICATIONS:    Current Outpatient Medications:     Albuterol Sulfate (PROAIR RESPICLICK) 108 (90 Base) MCG/ACT AEROSOL POWDER, BREATH ACTIVATED, Inhale 2 Puffs every four hours as needed (exercise asthma)., Disp: 1 Each, Rfl: 0    montelukast (SINGULAIR) 10 MG Tab, Take 1 Tablet by mouth 1 time a day as needed (sob)., Disp: 30 Tablet, Rfl: 3    PROBLEMS:  There are no problems to display for this patient.      REVIEW OF SYSTEMS:  Gen.:  No Nausea, Vomiting, fever, Chills.  Heart: No chest pain.  Lungs:  No shortness of Breath.  Psychological: Scotty unusual Anxiety depression     PHYSICAL EXAM   Constitutional: Alert, cooperative, not in acute distress.  Cardiovascular:  Rate Rhythm is regular without murmurs rubs clicks.     Thorax & Lungs: Clear to auscultation, no wheezing, rhonchi, or rales  HENT: Normocephalic, Atraumatic.  Eyes: PERRLA, EOMI, Conjunctiva normal.   Neck: Trachia is midline no swelling of the thyroid.   Lymphatic: No lymphadenopathy noted.   Abdomin: Soft non-tender, no rebound, no guarding.   Skin: Warm, Dry, No erythema, No rash.   Extremities: Atraumatic with symmetric distal pulses, No edema, No tenderness, No cyanosis, No clubbing.   Musculoskeletal: Moves all extremities without  "difficulty  Neurologic: Alert & oriented x 3, cranial nerves II through XII are intact, Normal motor function, Normal sensory function, No focal deficits noted.   Psychiatric: Affect normal, Judgment normal, Mood normal.     VITAL SIGNS:/74 (BP Location: Left arm, Patient Position: Sitting, BP Cuff Size: Large adult)   Pulse 65   Temp 37.1 °C (98.7 °F)   Resp 14   Ht 1.727 m (5' 8\")   Wt 107 kg (236 lb)   SpO2 96%   BMI 35.88 kg/m²     Labs: Reviewed    Assessment:                                                     Plan:    1. Sinus arrhythmia  Healthy no change in medical therapy    2. Right elbow pain  Referral written orthopedics  - Referral to Orthopedics    3. Mild persistent asthma with exacerbation  Continue albuterol singular as needed  - Albuterol Sulfate (PROAIR RESPICLICK) 108 (90 Base) MCG/ACT AEROSOL POWDER, BREATH ACTIVATED; Inhale 2 Puffs every four hours as needed (exercise asthma).  Dispense: 1 Each; Refill: 0  - montelukast (SINGULAIR) 10 MG Tab; Take 1 Tablet by mouth 1 time a day as needed (sob).  Dispense: 30 Tablet; Refill: 3        "

## 2024-03-11 ENCOUNTER — APPOINTMENT (OUTPATIENT)
Dept: MEDICAL GROUP | Facility: LAB | Age: 44
End: 2024-03-11
Payer: COMMERCIAL

## 2024-05-09 ENCOUNTER — APPOINTMENT (OUTPATIENT)
Dept: MEDICAL GROUP | Facility: LAB | Age: 44
End: 2024-05-09
Payer: COMMERCIAL

## 2024-09-05 ENCOUNTER — APPOINTMENT (OUTPATIENT)
Dept: MEDICAL GROUP | Facility: LAB | Age: 44
End: 2024-09-05
Payer: COMMERCIAL

## 2024-11-06 ENCOUNTER — HOSPITAL ENCOUNTER (OUTPATIENT)
Dept: RADIOLOGY | Facility: MEDICAL CENTER | Age: 44
End: 2024-11-06
Attending: OBSTETRICS & GYNECOLOGY
Payer: COMMERCIAL

## 2024-11-06 DIAGNOSIS — Z12.31 VISIT FOR SCREENING MAMMOGRAM: ICD-10-CM

## 2024-11-06 PROCEDURE — 77067 SCR MAMMO BI INCL CAD: CPT

## 2025-05-12 ENCOUNTER — HOSPITAL ENCOUNTER (OUTPATIENT)
Dept: LAB | Facility: MEDICAL CENTER | Age: 45
End: 2025-05-12
Payer: COMMERCIAL

## 2025-05-12 LAB
ALBUMIN SERPL BCP-MCNC: 4.2 G/DL (ref 3.2–4.9)
ALBUMIN/GLOB SERPL: 1.3 G/DL
ALP SERPL-CCNC: 38 U/L (ref 30–99)
ALT SERPL-CCNC: 28 U/L (ref 2–50)
ANION GAP SERPL CALC-SCNC: 9 MMOL/L (ref 7–16)
AST SERPL-CCNC: 21 U/L (ref 12–45)
BASOPHILS # BLD AUTO: 0.5 % (ref 0–1.8)
BASOPHILS # BLD: 0.04 K/UL (ref 0–0.12)
BILIRUB SERPL-MCNC: 1 MG/DL (ref 0.1–1.5)
BUN SERPL-MCNC: 16 MG/DL (ref 8–22)
CALCIUM ALBUM COR SERPL-MCNC: 9.3 MG/DL (ref 8.5–10.5)
CALCIUM SERPL-MCNC: 9.5 MG/DL (ref 8.5–10.5)
CHLORIDE SERPL-SCNC: 103 MMOL/L (ref 96–112)
CHOLEST SERPL-MCNC: 174 MG/DL (ref 100–199)
CO2 SERPL-SCNC: 27 MMOL/L (ref 20–33)
CREAT SERPL-MCNC: 0.95 MG/DL (ref 0.5–1.4)
EOSINOPHIL # BLD AUTO: 0.26 K/UL (ref 0–0.51)
EOSINOPHIL NFR BLD: 3.3 % (ref 0–6.9)
ERYTHROCYTE [DISTWIDTH] IN BLOOD BY AUTOMATED COUNT: 39.2 FL (ref 35.9–50)
EST. AVERAGE GLUCOSE BLD GHB EST-MCNC: 111 MG/DL
FASTING STATUS PATIENT QL REPORTED: NORMAL
GFR SERPLBLD CREATININE-BSD FMLA CKD-EPI: 76 ML/MIN/1.73 M 2
GLOBULIN SER CALC-MCNC: 3.3 G/DL (ref 1.9–3.5)
GLUCOSE SERPL-MCNC: 86 MG/DL (ref 65–99)
HBA1C MFR BLD: 5.5 % (ref 4–5.6)
HCT VFR BLD AUTO: 47 % (ref 37–47)
HDLC SERPL-MCNC: 67 MG/DL
HGB BLD-MCNC: 15.5 G/DL (ref 12–16)
IMM GRANULOCYTES # BLD AUTO: 0.04 K/UL (ref 0–0.11)
IMM GRANULOCYTES NFR BLD AUTO: 0.5 % (ref 0–0.9)
LDLC SERPL CALC-MCNC: 93 MG/DL
LYMPHOCYTES # BLD AUTO: 2.92 K/UL (ref 1–4.8)
LYMPHOCYTES NFR BLD: 37.5 % (ref 22–41)
MCH RBC QN AUTO: 29.4 PG (ref 27–33)
MCHC RBC AUTO-ENTMCNC: 33 G/DL (ref 32.2–35.5)
MCV RBC AUTO: 89.2 FL (ref 81.4–97.8)
MONOCYTES # BLD AUTO: 0.56 K/UL (ref 0–0.85)
MONOCYTES NFR BLD AUTO: 7.2 % (ref 0–13.4)
NEUTROPHILS # BLD AUTO: 3.96 K/UL (ref 1.82–7.42)
NEUTROPHILS NFR BLD: 51 % (ref 44–72)
NRBC # BLD AUTO: 0 K/UL
NRBC BLD-RTO: 0 /100 WBC (ref 0–0.2)
PLATELET # BLD AUTO: 295 K/UL (ref 164–446)
PMV BLD AUTO: 10.2 FL (ref 9–12.9)
POTASSIUM SERPL-SCNC: 4.6 MMOL/L (ref 3.6–5.5)
PROT SERPL-MCNC: 7.5 G/DL (ref 6–8.2)
RBC # BLD AUTO: 5.27 M/UL (ref 4.2–5.4)
SODIUM SERPL-SCNC: 139 MMOL/L (ref 135–145)
TRIGL SERPL-MCNC: 72 MG/DL (ref 0–149)
TSH SERPL-ACNC: 1.77 UIU/ML (ref 0.38–5.33)
WBC # BLD AUTO: 7.8 K/UL (ref 4.8–10.8)

## 2025-05-12 PROCEDURE — 36415 COLL VENOUS BLD VENIPUNCTURE: CPT

## 2025-05-12 PROCEDURE — 80061 LIPID PANEL: CPT

## 2025-05-12 PROCEDURE — 83036 HEMOGLOBIN GLYCOSYLATED A1C: CPT

## 2025-05-12 PROCEDURE — 84443 ASSAY THYROID STIM HORMONE: CPT

## 2025-05-12 PROCEDURE — 85025 COMPLETE CBC W/AUTO DIFF WBC: CPT

## 2025-05-12 PROCEDURE — 83525 ASSAY OF INSULIN: CPT

## 2025-05-12 PROCEDURE — 80053 COMPREHEN METABOLIC PANEL: CPT

## 2025-05-14 LAB — INSULIN P FAST SERPL-ACNC: 13 UIU/ML (ref 3–25)

## 2025-06-08 SDOH — HEALTH STABILITY: PHYSICAL HEALTH: ON AVERAGE, HOW MANY DAYS PER WEEK DO YOU ENGAGE IN MODERATE TO STRENUOUS EXERCISE (LIKE A BRISK WALK)?: 1 DAY

## 2025-06-08 SDOH — HEALTH STABILITY: PHYSICAL HEALTH: ON AVERAGE, HOW MANY MINUTES DO YOU ENGAGE IN EXERCISE AT THIS LEVEL?: 30 MIN

## 2025-06-08 SDOH — ECONOMIC STABILITY: TRANSPORTATION INSECURITY
IN THE PAST 12 MONTHS, HAS THE LACK OF TRANSPORTATION KEPT YOU FROM MEDICAL APPOINTMENTS OR FROM GETTING MEDICATIONS?: PATIENT DECLINED

## 2025-06-08 SDOH — ECONOMIC STABILITY: INCOME INSECURITY: HOW HARD IS IT FOR YOU TO PAY FOR THE VERY BASICS LIKE FOOD, HOUSING, MEDICAL CARE, AND HEATING?: PATIENT DECLINED

## 2025-06-08 SDOH — ECONOMIC STABILITY: FOOD INSECURITY: WITHIN THE PAST 12 MONTHS, THE FOOD YOU BOUGHT JUST DIDN'T LAST AND YOU DIDN'T HAVE MONEY TO GET MORE.: PATIENT DECLINED

## 2025-06-08 SDOH — ECONOMIC STABILITY: INCOME INSECURITY: IN THE LAST 12 MONTHS, WAS THERE A TIME WHEN YOU WERE NOT ABLE TO PAY THE MORTGAGE OR RENT ON TIME?: PATIENT DECLINED

## 2025-06-08 SDOH — ECONOMIC STABILITY: FOOD INSECURITY: WITHIN THE PAST 12 MONTHS, YOU WORRIED THAT YOUR FOOD WOULD RUN OUT BEFORE YOU GOT MONEY TO BUY MORE.: PATIENT DECLINED

## 2025-06-08 SDOH — ECONOMIC STABILITY: TRANSPORTATION INSECURITY
IN THE PAST 12 MONTHS, HAS LACK OF RELIABLE TRANSPORTATION KEPT YOU FROM MEDICAL APPOINTMENTS, MEETINGS, WORK OR FROM GETTING THINGS NEEDED FOR DAILY LIVING?: PATIENT DECLINED

## 2025-06-08 SDOH — ECONOMIC STABILITY: HOUSING INSECURITY
IN THE LAST 12 MONTHS, WAS THERE A TIME WHEN YOU DID NOT HAVE A STEADY PLACE TO SLEEP OR SLEPT IN A SHELTER (INCLUDING NOW)?: PATIENT DECLINED

## 2025-06-08 SDOH — ECONOMIC STABILITY: TRANSPORTATION INSECURITY
IN THE PAST 12 MONTHS, HAS LACK OF TRANSPORTATION KEPT YOU FROM MEETINGS, WORK, OR FROM GETTING THINGS NEEDED FOR DAILY LIVING?: PATIENT DECLINED

## 2025-06-08 SDOH — HEALTH STABILITY: MENTAL HEALTH
STRESS IS WHEN SOMEONE FEELS TENSE, NERVOUS, ANXIOUS, OR CAN'T SLEEP AT NIGHT BECAUSE THEIR MIND IS TROUBLED. HOW STRESSED ARE YOU?: ONLY A LITTLE

## 2025-06-08 ASSESSMENT — SOCIAL DETERMINANTS OF HEALTH (SDOH)
HOW OFTEN DO YOU HAVE SIX OR MORE DRINKS ON ONE OCCASION: PATIENT DECLINED
HOW OFTEN DO YOU ATTENT MEETINGS OF THE CLUB OR ORGANIZATION YOU BELONG TO?: PATIENT DECLINED
DO YOU BELONG TO ANY CLUBS OR ORGANIZATIONS SUCH AS CHURCH GROUPS UNIONS, FRATERNAL OR ATHLETIC GROUPS, OR SCHOOL GROUPS?: PATIENT DECLINED
DO YOU BELONG TO ANY CLUBS OR ORGANIZATIONS SUCH AS CHURCH GROUPS UNIONS, FRATERNAL OR ATHLETIC GROUPS, OR SCHOOL GROUPS?: PATIENT DECLINED
WITHIN THE PAST 12 MONTHS, YOU WORRIED THAT YOUR FOOD WOULD RUN OUT BEFORE YOU GOT THE MONEY TO BUY MORE: PATIENT DECLINED
HOW OFTEN DO YOU ATTENT MEETINGS OF THE CLUB OR ORGANIZATION YOU BELONG TO?: PATIENT DECLINED
HOW OFTEN DO YOU ATTEND CHURCH OR RELIGIOUS SERVICES?: PATIENT DECLINED
HOW OFTEN DO YOU HAVE A DRINK CONTAINING ALCOHOL: PATIENT DECLINED
HOW OFTEN DO YOU GET TOGETHER WITH FRIENDS OR RELATIVES?: PATIENT DECLINED
HOW OFTEN DO YOU ATTEND CHURCH OR RELIGIOUS SERVICES?: PATIENT DECLINED
IN A TYPICAL WEEK, HOW MANY TIMES DO YOU TALK ON THE PHONE WITH FAMILY, FRIENDS, OR NEIGHBORS?: PATIENT DECLINED
IN A TYPICAL WEEK, HOW MANY TIMES DO YOU TALK ON THE PHONE WITH FAMILY, FRIENDS, OR NEIGHBORS?: PATIENT DECLINED
HOW OFTEN DO YOU GET TOGETHER WITH FRIENDS OR RELATIVES?: PATIENT DECLINED
HOW HARD IS IT FOR YOU TO PAY FOR THE VERY BASICS LIKE FOOD, HOUSING, MEDICAL CARE, AND HEATING?: PATIENT DECLINED
HOW MANY DRINKS CONTAINING ALCOHOL DO YOU HAVE ON A TYPICAL DAY WHEN YOU ARE DRINKING: PATIENT DECLINED
IN THE PAST 12 MONTHS, HAS THE ELECTRIC, GAS, OIL, OR WATER COMPANY THREATENED TO SHUT OFF SERVICE IN YOUR HOME?: PATIENT DECLINED

## 2025-06-08 ASSESSMENT — LIFESTYLE VARIABLES
HOW OFTEN DO YOU HAVE A DRINK CONTAINING ALCOHOL: PATIENT DECLINED
AUDIT-C TOTAL SCORE: -1
HOW OFTEN DO YOU HAVE SIX OR MORE DRINKS ON ONE OCCASION: PATIENT DECLINED
HOW MANY STANDARD DRINKS CONTAINING ALCOHOL DO YOU HAVE ON A TYPICAL DAY: PATIENT DECLINED
SKIP TO QUESTIONS 9-10: 0

## 2025-06-10 ENCOUNTER — OFFICE VISIT (OUTPATIENT)
Dept: MEDICAL GROUP | Facility: LAB | Age: 45
End: 2025-06-10
Payer: COMMERCIAL

## 2025-06-10 VITALS
SYSTOLIC BLOOD PRESSURE: 114 MMHG | HEART RATE: 90 BPM | TEMPERATURE: 97.2 F | RESPIRATION RATE: 14 BRPM | HEIGHT: 68 IN | OXYGEN SATURATION: 98 % | WEIGHT: 251.3 LBS | BODY MASS INDEX: 38.09 KG/M2 | DIASTOLIC BLOOD PRESSURE: 64 MMHG

## 2025-06-10 DIAGNOSIS — J45.31 MILD PERSISTENT ASTHMA WITH EXACERBATION: ICD-10-CM

## 2025-06-10 DIAGNOSIS — R05.1 ACUTE COUGH: ICD-10-CM

## 2025-06-10 DIAGNOSIS — E66.812 CLASS 2 SEVERE OBESITY WITH SERIOUS COMORBIDITY AND BODY MASS INDEX (BMI) OF 38.0 TO 38.9 IN ADULT, UNSPECIFIED OBESITY TYPE (HCC): ICD-10-CM

## 2025-06-10 DIAGNOSIS — E66.01 CLASS 2 SEVERE OBESITY WITH SERIOUS COMORBIDITY AND BODY MASS INDEX (BMI) OF 38.0 TO 38.9 IN ADULT, UNSPECIFIED OBESITY TYPE (HCC): ICD-10-CM

## 2025-06-10 PROCEDURE — 3078F DIAST BP <80 MM HG: CPT | Performed by: INTERNAL MEDICINE

## 2025-06-10 PROCEDURE — 99214 OFFICE O/P EST MOD 30 MIN: CPT | Performed by: INTERNAL MEDICINE

## 2025-06-10 PROCEDURE — 3074F SYST BP LT 130 MM HG: CPT | Performed by: INTERNAL MEDICINE

## 2025-06-10 RX ORDER — ALBUTEROL SULFATE 90 UG/1
2 POWDER, METERED RESPIRATORY (INHALATION) EVERY 4 HOURS PRN
Qty: 1 EACH | Refills: 0 | Status: SHIPPED | OUTPATIENT
Start: 2025-06-10

## 2025-06-10 RX ORDER — GUAIFENESIN AND DEXTROMETHORPHAN HYDROBROMIDE 10; 100 MG/5ML; MG/5ML
5 SYRUP ORAL EVERY 6 HOURS PRN
Qty: 840 ML | Refills: 0 | Status: SHIPPED | OUTPATIENT
Start: 2025-06-10

## 2025-06-10 RX ORDER — MONTELUKAST SODIUM 10 MG/1
10 TABLET ORAL
Qty: 90 TABLET | Refills: 3 | Status: SHIPPED | OUTPATIENT
Start: 2025-06-10

## 2025-06-10 RX ORDER — ACETAMINOPHEN AND CODEINE PHOSPHATE 300; 30 MG/1; MG/1
1 TABLET ORAL EVERY 6 HOURS PRN
Qty: 28 TABLET | Refills: 0 | Status: SHIPPED | OUTPATIENT
Start: 2025-06-10 | End: 2025-06-17

## 2025-06-10 ASSESSMENT — FIBROSIS 4 INDEX: FIB4 SCORE: 0.58

## 2025-06-11 ENCOUNTER — PATIENT MESSAGE (OUTPATIENT)
Dept: MEDICAL GROUP | Facility: LAB | Age: 45
End: 2025-06-11
Payer: COMMERCIAL

## 2025-06-11 NOTE — PROGRESS NOTES
"CC: Amol Bello is a 45 y.o. female is suffering from   Chief Complaint   Patient presents with    Annual Exam    Lab Results    Snoring         SUBJECTIVE:  1. Mild persistent asthma with exacerbation  Jonny is here for follow-up has a history of persistent asthma is to use albuterol and cough medication as necessary    2. Acute cough  Acute cough best treated with Robitussin and Tylenol 3    3. 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      REVIEW OF SYSTEMS:  Gen.:  No Nausea, Vomiting, fever, Chills.  Heart: No chest pain.  Lungs:  No shortness of Breath.  Psychological: Scotty unusual Anxiety depression     PHYSICAL EXAM   Physical Exam             Constitutional: Alert, cooperative, not in acute distress.  Cardiovascular:  Rate Rhythm is regular without murmurs rubs clicks.     Thorax & Lungs: Clear to auscultation, no wheezing, rhonchi, or rales  HENT: Normocephalic, Atraumatic.  Eyes: PERRLA, EOMI, Conjunctiva normal.   Neck: Trachia is midline no swelling of the thyroid.   Lymphatic: No lymphadenopathy noted.   Neurologic: Alert & oriented x 3, cranial nerves II through XII are intact, Normal motor function, Normal sensory function, No focal deficits noted.   Psychiatric: Affect normal, Judgment normal, Mood normal.     VITAL SIGNS:/64 (BP Location: Left arm, Patient Position: Sitting, BP Cuff Size: Adult)   Pulse 90   Temp 36.2 °C (97.2 °F) (Temporal)   Resp 14   Ht 1.727 m (5' 8\")   Wt 114 kg (251 lb 4.8 oz)   SpO2 98%   BMI 38.21 kg/m²     Labs: Reviewed    Assessment:               "                                       Plan:  [unfilled]             1. Mild persistent asthma with exacerbation  Continue the Singulair albuterol  - montelukast (SINGULAIR) 10 MG Tab; Take 1 Tablet by mouth 1 time a day as needed (sob).  Dispense: 90 Tablet; Refill: 3  - acetaminophen-codeine #3 (TYLENOL #3) 300-30 MG Tab; Take 1 Tablet by mouth every 6 hours as needed for Mild Pain (cough take with robitussin) for up to 7 days.  Dispense: 28 Tablet; Refill: 0  - Dextromethorphan-guaiFENesin (TUSSIN DM)  MG/5ML Syrup; Take 5 mL by mouth every 6 hours as needed (take with Tylenol #3).  Dispense: 840 mL; Refill: 0  - Albuterol Sulfate (PROAIR RESPICLICK) 108 (90 Base) MCG/ACT AEROSOL POWDER, BREATH ACTIVATED; Inhale 2 Puffs every four hours as needed (exercise asthma).  Dispense: 1 Each; Refill: 0    2. Acute cough  Use Tylenol 3 with Robitussin  - montelukast (SINGULAIR) 10 MG Tab; Take 1 Tablet by mouth 1 time a day as needed (sob).  Dispense: 90 Tablet; Refill: 3  - acetaminophen-codeine #3 (TYLENOL #3) 300-30 MG Tab; Take 1 Tablet by mouth every 6 hours as needed for Mild Pain (cough take with robitussin) for up to 7 days.  Dispense: 28 Tablet; Refill: 0  - Dextromethorphan-guaiFENesin (TUSSIN DM)  MG/5ML Syrup; Take 5 mL by mouth every 6 hours as needed (take with Tylenol #3).  Dispense: 840 mL; Refill: 0    3. Class 2 severe obesity with serious comorbidity and body mass index (BMI) of 38.0 to 38.9 in adult, unspecified obesity type (HCC)  Referral written to vascular medicine  - Referral to Vascular Medicine             [1]   Social History  Tobacco Use    Smoking status: Never    Smokeless tobacco: Never   Vaping Use    Vaping status: Never Used   Substance Use Topics    Alcohol use: Yes   [2]   Current Outpatient Medications:     montelukast (SINGULAIR) 10 MG Tab, Take 1 Tablet by mouth 1 time a day as needed (sob)., Disp: 90 Tablet, Rfl: 3    acetaminophen-codeine #3 (TYLENOL #3) 300-30 MG  Tab, Take 1 Tablet by mouth every 6 hours as needed for Mild Pain (cough take with robitussin) for up to 7 days., Disp: 28 Tablet, Rfl: 0    Dextromethorphan-guaiFENesin (TUSSIN DM)  MG/5ML Syrup, Take 5 mL by mouth every 6 hours as needed (take with Tylenol #3)., Disp: 840 mL, Rfl: 0    Albuterol Sulfate (PROAIR RESPICLICK) 108 (90 Base) MCG/ACT AEROSOL POWDER, BREATH ACTIVATED, Inhale 2 Puffs every four hours as needed (exercise asthma)., Disp: 1 Each, Rfl: 0

## 2025-06-12 ENCOUNTER — TELEPHONE (OUTPATIENT)
Dept: MEDICAL GROUP | Facility: LAB | Age: 45
End: 2025-06-12
Payer: COMMERCIAL

## 2025-06-18 NOTE — Clinical Note
REFERRAL APPROVAL NOTICE         Sent on June 18, 2025                   Jonny Bello  [address] [place] [address]                   Dear Ms. Bello,    After a careful review of the medical information and benefit coverage, [facility] has processed your referral. See below for additional details.    If applicable, you must be actively enrolled with your insurance for coverage of the authorized service. If you have any questions regarding your coverage, please contact your insurance directly.    REFERRAL INFORMATION   Referral #:  [id number]  Referred-To Department    Referred-By Provider:  Vascular Medicine    Chun Kolb D.O.   Vascular Medicine      [id number] [address] [place] [address]  [phone] [address] [place], [place] [address] [place] [address]  [phone]    Referral Start Date:  06/10/2025  Referral End Date:   06/10/2026             SCHEDULING  If you do not already have an appointment, please call [phone] to make an appointment.     MORE INFORMATION  If you do not already have a [employer] account, sign up at: [email or web address]  You can access your medical information, make appointments, see lab results, billing information, and more.  If you have questions regarding this referral, please contact  the [facility] Referrals department at:             [phone]. Monday - Friday 8:00AM - 5:00PM.     Sincerely,    [facility]

## 2025-06-19 ENCOUNTER — TELEPHONE (OUTPATIENT)
Dept: HEALTH INFORMATION MANAGEMENT | Facility: OTHER | Age: 45
End: 2025-06-19
Payer: COMMERCIAL